# Patient Record
Sex: FEMALE | Race: WHITE | NOT HISPANIC OR LATINO | Employment: UNEMPLOYED
[De-identification: names, ages, dates, MRNs, and addresses within clinical notes are randomized per-mention and may not be internally consistent; named-entity substitution may affect disease eponyms.]

---

## 2018-11-29 ENCOUNTER — IMAGING SERVICES (OUTPATIENT)
Dept: OTHER | Age: 53
End: 2018-11-29

## 2018-11-29 ENCOUNTER — HOSPITAL (OUTPATIENT)
Dept: OTHER | Age: 53
End: 2018-11-29

## 2018-11-29 LAB
ALBUMIN SERPL-MCNC: 4 GM/DL (ref 3.6–5.1)
ALBUMIN/GLOB SERPL: 1 {RATIO} (ref 1–2.4)
ALP SERPL-CCNC: 100 UNIT/L (ref 45–117)
ALT SERPL-CCNC: 32 UNIT/L
ANALYZER ANC (IANC): NORMAL
ANION GAP SERPL CALC-SCNC: 13 MMOL/L (ref 10–20)
AST SERPL-CCNC: 21 UNIT/L
BASOPHILS # BLD: 0 THOUSAND/MCL (ref 0–0.3)
BASOPHILS NFR BLD: 0 %
BILIRUB SERPL-MCNC: 0.4 MG/DL (ref 0.2–1)
BUN SERPL-MCNC: 20 MG/DL (ref 6–20)
BUN/CREAT SERPL: 29 (ref 7–25)
CALCIUM SERPL-MCNC: 9.4 MG/DL (ref 8.4–10.2)
CHLORIDE: 104 MMOL/L (ref 98–107)
CO2 SERPL-SCNC: 27 MMOL/L (ref 21–32)
CREAT SERPL-MCNC: 0.69 MG/DL (ref 0.51–0.95)
D DIMER PPP FEU-MCNC: <0.19 MG/L FEU
DIFFERENTIAL METHOD BLD: NORMAL
EOSINOPHIL # BLD: 0.2 THOUSAND/MCL (ref 0.1–0.5)
EOSINOPHIL NFR BLD: 3 %
ERYTHROCYTE [DISTWIDTH] IN BLOOD: 13.2 % (ref 11–15)
GLOBULIN SER-MCNC: 4 GM/DL (ref 2–4)
GLUCOSE SERPL-MCNC: 100 MG/DL (ref 65–99)
HEMATOCRIT: 45 % (ref 36–46.5)
HGB BLD-MCNC: 15 GM/DL (ref 12–15.5)
LIPASE SERPL-CCNC: 156 UNIT/L (ref 73–393)
LYMPHOCYTES # BLD: 3.1 THOUSAND/MCL (ref 1–4)
LYMPHOCYTES NFR BLD: 37 %
MCH RBC QN AUTO: 29.6 PG (ref 26–34)
MCHC RBC AUTO-ENTMCNC: 33.3 GM/DL (ref 32–36.5)
MCV RBC AUTO: 88.9 FL (ref 78–100)
MONOCYTES # BLD: 0.7 THOUSAND/MCL (ref 0.3–0.9)
MONOCYTES NFR BLD: 8 %
NEUTROPHILS # BLD: 4.4 THOUSAND/MCL (ref 1.8–7.7)
NEUTROPHILS NFR BLD: 52 %
NEUTS SEG NFR BLD: NORMAL %
NRBC (NRBCRE): NORMAL
PLATELET # BLD: 270 THOUSAND/MCL (ref 140–450)
POTASSIUM SERPL-SCNC: 3.8 MMOL/L (ref 3.4–5.1)
PROT SERPL-MCNC: 8 GM/DL (ref 6.4–8.2)
RBC # BLD: 5.06 MILLION/MCL (ref 4–5.2)
SODIUM SERPL-SCNC: 140 MMOL/L (ref 135–145)
TROPONIN I SERPL HS-MCNC: <0.02 NG/ML
WBC # BLD: 8.4 THOUSAND/MCL (ref 4.2–11)

## 2018-11-30 LAB
CHOLEST SERPL-MCNC: 289 MG/DL
CHOLEST/HDLC SERPL: 2.8 {RATIO}
HDLC SERPL-MCNC: 102 MG/DL
LDLC SERPL CALC-MCNC: 181 MG/DL
NONHDLC SERPL-MCNC: 187 MG/DL
TRIGLYCERIDE (TRIGP): 30 MG/DL
TROPONIN I SERPL HS-MCNC: <0.02 NG/ML

## 2019-01-11 ENCOUNTER — HOSPITAL (OUTPATIENT)
Dept: OTHER | Age: 54
End: 2019-01-11
Attending: INTERNAL MEDICINE

## 2020-11-01 ENCOUNTER — LAB SERVICES (OUTPATIENT)
Dept: LAB | Age: 55
End: 2020-11-01

## 2020-11-01 DIAGNOSIS — Z01.812 PRE-PROCEDURAL LABORATORY EXAMINATIONS: ICD-10-CM

## 2020-11-01 PROCEDURE — U0003 INFECTIOUS AGENT DETECTION BY NUCLEIC ACID (DNA OR RNA); SEVERE ACUTE RESPIRATORY SYNDROME CORONAVIRUS 2 (SARS-COV-2) (CORONAVIRUS DISEASE [COVID-19]), AMPLIFIED PROBE TECHNIQUE, MAKING USE OF HIGH THROUGHPUT TECHNOLOGIES AS DESCRIBED BY CMS-2020-01-R: HCPCS | Performed by: ORTHOPAEDIC SURGERY

## 2020-11-02 LAB
SARS-COV-2 RNA RESP QL NAA+PROBE: NOT DETECTED
SERVICE CMNT-IMP: NORMAL
SPECIMEN SOURCE: NORMAL

## 2020-11-03 RX ORDER — ENALAPRIL MALEATE 10 MG/1
10 TABLET ORAL DAILY
COMMUNITY

## 2020-11-03 RX ORDER — ATORVASTATIN CALCIUM 10 MG/1
40 TABLET, FILM COATED ORAL DAILY
COMMUNITY

## 2020-11-03 ASSESSMENT — ACTIVITIES OF DAILY LIVING (ADL): SENSORY_SUPPORT_DEVICES: DENTURES

## 2020-11-04 ENCOUNTER — HOSPITAL ENCOUNTER (OUTPATIENT)
Age: 55
Discharge: HOME | End: 2020-11-04
Attending: ORTHOPAEDIC SURGERY | Admitting: ORTHOPAEDIC SURGERY

## 2020-11-04 VITALS
DIASTOLIC BLOOD PRESSURE: 82 MMHG | OXYGEN SATURATION: 98 % | SYSTOLIC BLOOD PRESSURE: 136 MMHG | HEIGHT: 65 IN | BODY MASS INDEX: 24.9 KG/M2 | WEIGHT: 149.47 LBS | TEMPERATURE: 97.3 F | HEART RATE: 60 BPM | RESPIRATION RATE: 16 BRPM

## 2020-11-04 DIAGNOSIS — Z01.812 PRE-PROCEDURAL LABORATORY EXAMINATIONS: Primary | ICD-10-CM

## 2020-11-04 PROCEDURE — 10002801 HB RX 250 W/O HCPCS: Performed by: ORTHOPAEDIC SURGERY

## 2020-11-04 PROCEDURE — 13000054 HB LOCAL 1 CASE CHARGE: Performed by: ORTHOPAEDIC SURGERY

## 2020-11-04 PROCEDURE — 10006023 HB SUPPLY 272: Performed by: ORTHOPAEDIC SURGERY

## 2020-11-04 RX ORDER — DOCUSATE SODIUM 100 MG/1
100 CAPSULE, LIQUID FILLED ORAL 2 TIMES DAILY
Qty: 60 CAPSULE | Refills: 0 | Status: SHIPPED | OUTPATIENT
Start: 2020-11-04

## 2020-11-04 RX ORDER — HYDROCODONE BITARTRATE AND ACETAMINOPHEN 5; 325 MG/1; MG/1
1 TABLET ORAL EVERY 6 HOURS PRN
Qty: 15 TABLET | Refills: 0 | Status: SHIPPED | OUTPATIENT
Start: 2020-11-04

## 2020-11-04 ASSESSMENT — PAIN SCALES - GENERAL: PAINLEVEL_OUTOF10: 0

## 2021-04-02 ENCOUNTER — HOSPITAL ENCOUNTER (OUTPATIENT)
Dept: VASCULAR LAB | Age: 56
Discharge: HOME | End: 2021-04-02
Attending: INTERNAL MEDICINE

## 2021-04-02 DIAGNOSIS — M79.641 RIGHT HAND PAIN: ICD-10-CM

## 2021-04-02 PROCEDURE — 93931 UPPER EXTREMITY STUDY: CPT

## 2021-05-20 ENCOUNTER — HOSPITAL ENCOUNTER (EMERGENCY)
Facility: HOSPITAL | Age: 56
Discharge: HOME | End: 2021-05-20
Attending: EMERGENCY MEDICINE
Payer: COMMERCIAL

## 2021-05-20 ENCOUNTER — APPOINTMENT (OUTPATIENT)
Dept: GENERAL RADIOLOGY | Facility: HOSPITAL | Age: 56
End: 2021-05-20
Attending: EMERGENCY MEDICINE
Payer: COMMERCIAL

## 2021-05-20 VITALS
BODY MASS INDEX: 24.99 KG/M2 | RESPIRATION RATE: 18 BRPM | OXYGEN SATURATION: 98 % | DIASTOLIC BLOOD PRESSURE: 107 MMHG | SYSTOLIC BLOOD PRESSURE: 166 MMHG | WEIGHT: 150 LBS | TEMPERATURE: 98 F | HEIGHT: 65 IN | HEART RATE: 59 BPM

## 2021-05-20 DIAGNOSIS — S52.532A CLOSED COLLES' FRACTURE OF LEFT RADIUS, INITIAL ENCOUNTER: Primary | ICD-10-CM

## 2021-05-20 PROCEDURE — 73110 X-RAY EXAM OF WRIST: CPT | Performed by: EMERGENCY MEDICINE

## 2021-05-20 PROCEDURE — 99284 EMERGENCY DEPT VISIT MOD MDM: CPT

## 2021-05-20 PROCEDURE — 25605 CLTX DST RDL FX/EPHYS SEP W/: CPT

## 2021-05-20 RX ORDER — HYDROCODONE BITARTRATE AND ACETAMINOPHEN 5; 325 MG/1; MG/1
1 TABLET ORAL EVERY 6 HOURS PRN
Qty: 12 TABLET | Refills: 0 | Status: SHIPPED | OUTPATIENT
Start: 2021-05-20

## 2021-05-20 RX ORDER — LIDOCAINE HYDROCHLORIDE 10 MG/ML
20 INJECTION, SOLUTION EPIDURAL; INFILTRATION; INTRACAUDAL; PERINEURAL ONCE
Status: COMPLETED | OUTPATIENT
Start: 2021-05-20 | End: 2021-05-20

## 2021-05-21 NOTE — ED PROVIDER NOTES
Patient Seen in: [facility] AND [facility] Emergency Department    History   Patient presents with:  Arm or Hand Injury      HPI    Patient presents to the ED complaining of falling today and bracing her fall with an outstretched left arm.   She states that she i super sani-cloth germicidal wipes following the exam.     Physical Exam  Vitals and nursing note reviewed. Constitutional:       General: She is not in acute distress. Appearance: She is well-developed.    HENT:      Head: Normocephalic and atraumatic can't reach me at this number, do not leave a voicemail. Please call [phone] ext [phone] and ask for the next available Radiologist.                Chelsea Perla M.D.   Preliminary Radiology Report  [facility], [facility] and [facility]  [phone] [facility] does not have a problem list on file. to contribute to the complexity of this ED evaluation. ED Course: Patient presents to the ED with a left wrist injury and obvious fracture on exam.  X-ray confirms Colles' fracture.   Hematoma block performed myself

## 2021-10-22 ENCOUNTER — HOSPITAL ENCOUNTER (OUTPATIENT)
Dept: MRI IMAGING | Age: 56
Discharge: HOME | End: 2021-10-22
Attending: INTERNAL MEDICINE

## 2021-10-22 DIAGNOSIS — M25.512 LEFT SHOULDER PAIN: ICD-10-CM

## 2021-10-22 PROCEDURE — 73221 MRI JOINT UPR EXTREM W/O DYE: CPT

## (undated) DEVICE — Device

## (undated) DEVICE — LAWSON - SPONGE GAUZE 4X4 12PLY STL 10S

## (undated) DEVICE — LAWSON - BANDAGE SOFORM CONFORM STL4X75